# Patient Record
Sex: FEMALE | NOT HISPANIC OR LATINO | URBAN - METROPOLITAN AREA
[De-identification: names, ages, dates, MRNs, and addresses within clinical notes are randomized per-mention and may not be internally consistent; named-entity substitution may affect disease eponyms.]

---

## 2018-01-11 ENCOUNTER — IMPORTED ENCOUNTER (OUTPATIENT)
Dept: URBAN - METROPOLITAN AREA CLINIC 50 | Facility: CLINIC | Age: 68
End: 2018-01-11

## 2018-01-26 ENCOUNTER — IMPORTED ENCOUNTER (OUTPATIENT)
Dept: URBAN - METROPOLITAN AREA CLINIC 50 | Facility: CLINIC | Age: 68
End: 2018-01-26

## 2018-02-06 ENCOUNTER — IMPORTED ENCOUNTER (OUTPATIENT)
Dept: URBAN - METROPOLITAN AREA CLINIC 50 | Facility: CLINIC | Age: 68
End: 2018-02-06

## 2018-02-09 ENCOUNTER — IMPORTED ENCOUNTER (OUTPATIENT)
Dept: URBAN - METROPOLITAN AREA CLINIC 50 | Facility: CLINIC | Age: 68
End: 2018-02-09

## 2018-02-15 ENCOUNTER — IMPORTED ENCOUNTER (OUTPATIENT)
Dept: URBAN - METROPOLITAN AREA CLINIC 50 | Facility: CLINIC | Age: 68
End: 2018-02-15

## 2018-02-23 ENCOUNTER — IMPORTED ENCOUNTER (OUTPATIENT)
Dept: URBAN - METROPOLITAN AREA CLINIC 50 | Facility: CLINIC | Age: 68
End: 2018-02-23

## 2018-03-01 ENCOUNTER — IMPORTED ENCOUNTER (OUTPATIENT)
Dept: URBAN - METROPOLITAN AREA CLINIC 50 | Facility: CLINIC | Age: 68
End: 2018-03-01

## 2018-03-05 NOTE — PROCEDURE NOTE: SURGICAL
<p>Prior to commencing surgery patient identification, surgical procedure, site, and side were confirmed by Dr. Felicita Whitten. Following topical proparacaine anesthesia, the patient was positioned at the YAG laser, a contact lens coupled to the cornea with methylcellulose and an axial posterior capsulotomy performed without complication using 3.2 Mj x 48. One drop of Alphagan was instilled and the patient returned to the holding area having tolerated the procedure well and without complication. </p><p>MRN [id number]</p>

## 2018-03-09 ENCOUNTER — IMPORTED ENCOUNTER (OUTPATIENT)
Dept: URBAN - METROPOLITAN AREA CLINIC 50 | Facility: CLINIC | Age: 68
End: 2018-03-09

## 2018-03-19 ENCOUNTER — IMPORTED ENCOUNTER (OUTPATIENT)
Dept: URBAN - METROPOLITAN AREA CLINIC 50 | Facility: CLINIC | Age: 68
End: 2018-03-19

## 2018-03-27 ENCOUNTER — IMPORTED ENCOUNTER (OUTPATIENT)
Dept: URBAN - METROPOLITAN AREA CLINIC 50 | Facility: CLINIC | Age: 68
End: 2018-03-27

## 2018-03-28 ENCOUNTER — IMPORTED ENCOUNTER (OUTPATIENT)
Dept: URBAN - METROPOLITAN AREA CLINIC 50 | Facility: CLINIC | Age: 68
End: 2018-03-28

## 2018-05-02 ENCOUNTER — IMPORTED ENCOUNTER (OUTPATIENT)
Dept: URBAN - METROPOLITAN AREA CLINIC 50 | Facility: CLINIC | Age: 68
End: 2018-05-02

## 2018-08-08 ENCOUNTER — IMPORTED ENCOUNTER (OUTPATIENT)
Dept: URBAN - METROPOLITAN AREA CLINIC 50 | Facility: CLINIC | Age: 68
End: 2018-08-08

## 2019-08-05 ENCOUNTER — IMPORTED ENCOUNTER (OUTPATIENT)
Dept: URBAN - METROPOLITAN AREA CLINIC 50 | Facility: CLINIC | Age: 69
End: 2019-08-05

## 2019-08-08 ENCOUNTER — IMPORTED ENCOUNTER (OUTPATIENT)
Dept: URBAN - METROPOLITAN AREA CLINIC 50 | Facility: CLINIC | Age: 69
End: 2019-08-08

## 2020-08-06 ENCOUNTER — IMPORTED ENCOUNTER (OUTPATIENT)
Dept: URBAN - METROPOLITAN AREA CLINIC 50 | Facility: CLINIC | Age: 70
End: 2020-08-06

## 2020-08-19 ENCOUNTER — IMPORTED ENCOUNTER (OUTPATIENT)
Dept: URBAN - METROPOLITAN AREA CLINIC 50 | Facility: CLINIC | Age: 70
End: 2020-08-19

## 2020-10-08 ENCOUNTER — IMPORTED ENCOUNTER (OUTPATIENT)
Dept: URBAN - METROPOLITAN AREA CLINIC 50 | Facility: CLINIC | Age: 70
End: 2020-10-08

## 2020-11-12 ENCOUNTER — IMPORTED ENCOUNTER (OUTPATIENT)
Dept: URBAN - METROPOLITAN AREA CLINIC 50 | Facility: CLINIC | Age: 70
End: 2020-11-12

## 2020-12-10 ENCOUNTER — IMPORTED ENCOUNTER (OUTPATIENT)
Dept: URBAN - METROPOLITAN AREA CLINIC 50 | Facility: CLINIC | Age: 70
End: 2020-12-10

## 2021-01-21 ENCOUNTER — IMPORTED ENCOUNTER (OUTPATIENT)
Dept: URBAN - METROPOLITAN AREA CLINIC 50 | Facility: CLINIC | Age: 71
End: 2021-01-21

## 2021-02-25 ENCOUNTER — IMPORTED ENCOUNTER (OUTPATIENT)
Dept: URBAN - METROPOLITAN AREA CLINIC 50 | Facility: CLINIC | Age: 71
End: 2021-02-25

## 2021-04-01 ENCOUNTER — IMPORTED ENCOUNTER (OUTPATIENT)
Dept: URBAN - METROPOLITAN AREA CLINIC 50 | Facility: CLINIC | Age: 71
End: 2021-04-01

## 2021-04-18 ASSESSMENT — VISUAL ACUITY
OD_CC: J1+@ 16 IN
OS_OTHER: <400. <400.
OS_CC: J1+
OD_BAT: 20/30
OS_SC: 20/20
OD_SC: 20/20
OS_CC: J1+-2
OS_SC: 20/30+2
OS_SC: 20/20
OS_CC: J1+@ 16 IN
OS_OTHER: <400.
OD_SC: 20/20
OS_BAT: 20/50
OS_CC: 20/40
OS_SC: 20/25+-
OD_CC: 20/30
OS_SC: 20/20-2
OS_CC: J1+-
OS_OTHER: 20/40. 20/50.
OS_SC: 20/25
OD_CC: 20/30
OD_BAT: 20/25
OS_PH: @ 16 IN
OS_OTHER: 20/50. 20/400.
OS_BAT: <400
OD_CC: J1+
OD_OTHER: 20/25. 20/30.
OD_BAT: 20/80
OS_PH: 20/20-
OD_SC: 20/20
OD_BAT: 20/80
OS_PH: @ 17 IN
OS_CC: J1
OD_SC: 20/20-
OS_SC: 20/30
OD_CC: J1+-
OD_OTHER: 20/80. <400.
OS_PH: 20/25
OD_CC: J1
OD_BAT: 20/100
OD_OTHER: 20/80. <400.
OD_OTHER: 20/30. 20/40.
OD_CC: 20/25
OD_BAT: 20/80
OD_OTHER: 20/80. <400.
OS_SC: 20/30+
OS_SC: 20/20
OS_SC: 20/20
OS_OTHER: 20/40. 20/50.
OS_OTHER: 20/20. 20/25.
OS_CC: 20/40
OD_PH: @ 16 IN
OS_CC: 20/20
OD_OTHER: 20/100. >20/400.
OD_SC: 20/20-1
OD_SC: 20/20
OD_CC: J1+-2
OD_SC: 20/20
OD_OTHER: 20/30. 20/40.
OS_SC: 20/20
OD_SC: 20/20-1
OS_SC: 20/20-
OD_CC: J1+
OD_SC: 20/20-
OS_BAT: <400
OD_SC: 20/20
OD_BAT: 20/30
OS_CC: 20/40
OS_CC: J1+
OS_BAT: 20/20
OS_BAT: 20/40
OS_BAT: 20/40
OD_SC: 20/20-2

## 2021-04-18 ASSESSMENT — TONOMETRY
OD_IOP_MMHG: 16
OS_IOP_MMHG: 17
OD_IOP_MMHG: 16
OS_IOP_MMHG: 18
OD_IOP_MMHG: 19
OD_IOP_MMHG: 17
OS_IOP_MMHG: 24
OS_IOP_MMHG: 15
OD_IOP_MMHG: 18
OD_IOP_MMHG: 17
OD_IOP_MMHG: 18
OS_IOP_MMHG: 17
OS_IOP_MMHG: 17
OS_IOP_MMHG: 21
OD_IOP_MMHG: 22
OD_IOP_MMHG: 18
OD_IOP_MMHG: 17
OS_IOP_MMHG: 15
OD_IOP_MMHG: 17
OD_IOP_MMHG: 21
OD_IOP_MMHG: 18
OS_IOP_MMHG: 16
OD_IOP_MMHG: 16
OD_IOP_MMHG: 18
OS_IOP_MMHG: 15
OS_IOP_MMHG: 17
OS_IOP_MMHG: 15
OS_IOP_MMHG: 18
OD_IOP_MMHG: 16
OS_IOP_MMHG: 14
OD_IOP_MMHG: 16
OD_IOP_MMHG: 18
OD_IOP_MMHG: 17
OS_IOP_MMHG: 16
OD_IOP_MMHG: 21
OD_IOP_MMHG: 18
OS_IOP_MMHG: 14
OS_IOP_MMHG: 16
OS_IOP_MMHG: 16
OS_IOP_MMHG: 17
OS_IOP_MMHG: 17
OS_IOP_MMHG: 19
OD_IOP_MMHG: 16
OD_IOP_MMHG: 20
OS_IOP_MMHG: 15
OS_IOP_MMHG: 16

## 2021-04-18 ASSESSMENT — PACHYMETRY
OS_CT_UM: 578
OD_CT_UM: 575
OD_CT_UM: 575
OS_CT_UM: 578
OS_CT_UM: 578
OD_CT_UM: 575
OS_CT_UM: 578
OS_CT_UM: 578

## 2021-07-30 ENCOUNTER — PREPPED CHART (OUTPATIENT)
Dept: URBAN - METROPOLITAN AREA CLINIC 53 | Facility: CLINIC | Age: 71
End: 2021-07-30

## 2021-07-30 VITALS — HEIGHT: 55 IN

## 2021-07-30 ASSESSMENT — TONOMETRY
OS_IOP_MMHG: 17
OD_IOP_MMHG: 16
OS_IOP_MMHG: 15
OD_IOP_MMHG: 17

## 2021-07-30 ASSESSMENT — PACHYMETRY
OD_CT_UM: 575
OS_CT_UM: 578

## 2021-08-05 ENCOUNTER — COMPREHENSIVE EXAM (OUTPATIENT)
Dept: URBAN - METROPOLITAN AREA CLINIC 53 | Facility: CLINIC | Age: 71
End: 2021-08-05

## 2021-08-05 DIAGNOSIS — H43.813: ICD-10-CM

## 2021-08-05 DIAGNOSIS — E11.9: ICD-10-CM

## 2021-08-05 DIAGNOSIS — Z79.4: ICD-10-CM

## 2021-08-05 DIAGNOSIS — H40.1121: ICD-10-CM

## 2021-08-05 PROCEDURE — 92014 COMPRE OPH EXAM EST PT 1/>: CPT

## 2021-08-05 ASSESSMENT — TONOMETRY
OD_IOP_MMHG: 17
OD_IOP_MMHG: 16
OS_IOP_MMHG: 17
OS_IOP_MMHG: 15

## 2021-08-05 ASSESSMENT — VISUAL ACUITY
OU_SC: J1
OU_SC: 20/20
OS_SC: 20/20-1
OD_SC: 20/20

## 2021-11-04 ENCOUNTER — DIAGNOSTICS - HVF (OUTPATIENT)
Dept: URBAN - METROPOLITAN AREA CLINIC 53 | Facility: CLINIC | Age: 71
End: 2021-11-04

## 2021-11-04 DIAGNOSIS — H40.1121: ICD-10-CM

## 2021-11-04 PROCEDURE — 92083 EXTENDED VISUAL FIELD XM: CPT

## 2022-08-12 ENCOUNTER — COMPREHENSIVE EXAM (OUTPATIENT)
Dept: URBAN - METROPOLITAN AREA CLINIC 52 | Facility: CLINIC | Age: 72
End: 2022-08-12

## 2022-08-12 DIAGNOSIS — H40.1121: ICD-10-CM

## 2022-08-12 DIAGNOSIS — Z79.4: ICD-10-CM

## 2022-08-12 DIAGNOSIS — E11.9: ICD-10-CM

## 2022-08-12 PROCEDURE — 92014 COMPRE OPH EXAM EST PT 1/>: CPT

## 2022-08-12 ASSESSMENT — VISUAL ACUITY
OD_GLARE: 20/25
OS_GLARE: 20/20
OD_SC: 20/20
OS_SC: 20/20-1
OD_GLARE: 20/20
OU_SC: J1+
OU_SC: 20/20
OS_GLARE: 20/25

## 2022-08-12 ASSESSMENT — TONOMETRY
OD_IOP_MMHG: 15
OD_IOP_MMHG: 16
OS_IOP_MMHG: 16
OS_IOP_MMHG: 14

## 2023-08-17 ENCOUNTER — COMPREHENSIVE EXAM (OUTPATIENT)
Dept: URBAN - METROPOLITAN AREA CLINIC 53 | Facility: CLINIC | Age: 73
End: 2023-08-17

## 2023-08-17 DIAGNOSIS — H02.831: ICD-10-CM

## 2023-08-17 DIAGNOSIS — H43.813: ICD-10-CM

## 2023-08-17 DIAGNOSIS — H53.8: ICD-10-CM

## 2023-08-17 DIAGNOSIS — H02.834: ICD-10-CM

## 2023-08-17 DIAGNOSIS — Z79.4: ICD-10-CM

## 2023-08-17 DIAGNOSIS — E11.9: ICD-10-CM

## 2023-08-17 DIAGNOSIS — H40.1121: ICD-10-CM

## 2023-08-17 PROCEDURE — 92014 COMPRE OPH EXAM EST PT 1/>: CPT

## 2023-08-17 ASSESSMENT — VISUAL ACUITY
OD_GLARE: 20/25
OU_CC: J1+@16"
OS_GLARE: 20/25
OD_SC: 20/25
OS_SC: 20/20-1
OS_GLARE: 20/20
OD_GLARE: 20/40

## 2023-08-17 ASSESSMENT — TONOMETRY
OS_IOP_MMHG: 17
OD_IOP_MMHG: 18
OD_IOP_MMHG: 17
OS_IOP_MMHG: 16

## 2024-11-12 ENCOUNTER — COMPREHENSIVE EXAM (OUTPATIENT)
Dept: URBAN - METROPOLITAN AREA CLINIC 53 | Facility: CLINIC | Age: 74
End: 2024-11-12

## 2024-11-12 DIAGNOSIS — H43.813: ICD-10-CM

## 2024-11-12 DIAGNOSIS — H40.013: ICD-10-CM

## 2024-11-12 DIAGNOSIS — E11.9: ICD-10-CM

## 2024-11-12 DIAGNOSIS — H35.363: ICD-10-CM

## 2024-11-12 DIAGNOSIS — H04.123: ICD-10-CM

## 2024-11-12 DIAGNOSIS — Z79.4: ICD-10-CM

## 2024-11-12 DIAGNOSIS — H02.831: ICD-10-CM

## 2024-11-12 DIAGNOSIS — H26.493: ICD-10-CM

## 2024-11-12 DIAGNOSIS — H02.834: ICD-10-CM

## 2024-11-12 PROCEDURE — 92134 CPTRZ OPH DX IMG PST SGM RTA: CPT

## 2024-11-12 PROCEDURE — 99214 OFFICE O/P EST MOD 30 MIN: CPT

## 2024-12-17 ENCOUNTER — DIAGNOSTICS ONLY (OUTPATIENT)
Age: 74
End: 2024-12-17

## 2024-12-17 DIAGNOSIS — H40.013: ICD-10-CM

## 2024-12-17 PROCEDURE — 92133 CPTRZD OPH DX IMG PST SGM ON: CPT

## 2024-12-17 PROCEDURE — 92083 EXTENDED VISUAL FIELD XM: CPT

## 2025-05-21 ENCOUNTER — EMERGENCY VISIT (OUTPATIENT)
Age: 75
End: 2025-05-21

## 2025-05-21 DIAGNOSIS — H00.031: ICD-10-CM

## 2025-05-21 DIAGNOSIS — H40.013: ICD-10-CM

## 2025-05-21 PROCEDURE — 67700 BLEPHAROTOMY DRG ABSC EYELID: CPT

## 2025-05-21 PROCEDURE — 99214 OFFICE O/P EST MOD 30 MIN: CPT | Mod: 25

## 2025-05-21 RX ORDER — ERYTHROMYCIN 5 MG/G
OINTMENT OPHTHALMIC
Start: 2025-05-21

## 2025-05-21 RX ORDER — CEPHALEXIN 500 MG/1
1 CAPSULE ORAL TWICE A DAY
Start: 2025-05-21